# Patient Record
Sex: FEMALE | Race: BLACK OR AFRICAN AMERICAN | NOT HISPANIC OR LATINO | URBAN - METROPOLITAN AREA
[De-identification: names, ages, dates, MRNs, and addresses within clinical notes are randomized per-mention and may not be internally consistent; named-entity substitution may affect disease eponyms.]

---

## 2020-06-25 ENCOUNTER — APPOINTMENT (RX ONLY)
Dept: URBAN - METROPOLITAN AREA CLINIC 1 | Facility: CLINIC | Age: 60
Setting detail: DERMATOLOGY
End: 2020-06-25

## 2020-06-25 DIAGNOSIS — L30.1 DYSHIDROSIS [POMPHOLYX]: ICD-10-CM

## 2020-06-25 DIAGNOSIS — L29.8 OTHER PRURITUS: ICD-10-CM

## 2020-06-25 DIAGNOSIS — L29.89 OTHER PRURITUS: ICD-10-CM

## 2020-06-25 DIAGNOSIS — L81.4 OTHER MELANIN HYPERPIGMENTATION: ICD-10-CM

## 2020-06-25 DIAGNOSIS — L28.0 LICHEN SIMPLEX CHRONICUS: ICD-10-CM

## 2020-06-25 DIAGNOSIS — L30.0 NUMMULAR DERMATITIS: ICD-10-CM

## 2020-06-25 PROCEDURE — ? PRESCRIPTION

## 2020-06-25 PROCEDURE — 99203 OFFICE O/P NEW LOW 30 MIN: CPT | Mod: 25

## 2020-06-25 PROCEDURE — 11900 INJECT SKIN LESIONS </W 7: CPT

## 2020-06-25 PROCEDURE — ? COUNSELING

## 2020-06-25 PROCEDURE — ? TREATMENT REGIMEN

## 2020-06-25 PROCEDURE — ? INTRALESIONAL KENALOG

## 2020-06-25 RX ORDER — TRIAMCINOLONE ACETONIDE 1 MG/G
OINTMENT TOPICAL
Qty: 1 | Refills: 2 | Status: ERX | COMMUNITY
Start: 2020-06-25

## 2020-06-25 RX ADMIN — TRIAMCINOLONE ACETONIDE: 1 OINTMENT TOPICAL at 00:00

## 2020-06-25 ASSESSMENT — LOCATION DETAILED DESCRIPTION DERM
LOCATION DETAILED: RIGHT THENAR EMINENCE
LOCATION DETAILED: LEFT ANTERIOR PROXIMAL THIGH
LOCATION DETAILED: LEFT THENAR EMINENCE
LOCATION DETAILED: LEFT LATERAL DISTAL PRETIBIAL REGION
LOCATION DETAILED: LEFT ULNAR PALM

## 2020-06-25 ASSESSMENT — LOCATION ZONE DERM
LOCATION ZONE: HAND
LOCATION ZONE: LEG

## 2020-06-25 ASSESSMENT — LOCATION SIMPLE DESCRIPTION DERM
LOCATION SIMPLE: LEFT THIGH
LOCATION SIMPLE: LEFT PRETIBIAL REGION
LOCATION SIMPLE: LEFT HAND
LOCATION SIMPLE: RIGHT HAND

## 2020-06-25 NOTE — PROCEDURE: TREATMENT REGIMEN
Detail Level: Zone
Initiate Treatment: triamcinolone acetonide 0.1 % topical ointment \\nDays Supply: 30\\nApply to hands and site  on  left lower leg bid after moisturizer\\n\\nWear gloves as much as possible
Initiate Treatment: Zyrtec bid Benadryl qhs
Initiate Treatment: Triamcinolone to left upper thigh
Initiate Treatment: Cerave to hands and lower legs two to three times daily

## 2020-06-25 NOTE — PROCEDURE: INTRALESIONAL KENALOG
Detail Level: Detailed
Concentration Of Solution Injected (Mg/Ml): 2.5
Medical Necessity Clause: This procedure was medically necessary because the lesions that were treated were:
Consent: The risks of atrophy were reviewed with the patient.
X Size Of Lesion In Cm (Optional): 0
Treatment Number (Optional): 1
Total Volume Injected (Ccs- Only Use Numbers And Decimals): 0.5
Include Z78.9 (Other Specified Conditions Influencing Health Status) As An Associated Diagnosis?: No
Kenalog Preparation: Kenalog

## 2020-07-16 ENCOUNTER — APPOINTMENT (RX ONLY)
Dept: URBAN - METROPOLITAN AREA CLINIC 1 | Facility: CLINIC | Age: 60
Setting detail: DERMATOLOGY
End: 2020-07-16

## 2020-07-16 DIAGNOSIS — L28.0 LICHEN SIMPLEX CHRONICUS: ICD-10-CM | Status: RESOLVING

## 2020-07-16 DIAGNOSIS — L30.1 DYSHIDROSIS [POMPHOLYX]: ICD-10-CM

## 2020-07-16 PROCEDURE — ? TREATMENT REGIMEN

## 2020-07-16 PROCEDURE — ? COUNSELING

## 2020-07-16 PROCEDURE — ? ADDITIONAL NOTES

## 2020-07-16 PROCEDURE — 99213 OFFICE O/P EST LOW 20 MIN: CPT

## 2020-07-16 ASSESSMENT — LOCATION SIMPLE DESCRIPTION DERM
LOCATION SIMPLE: RIGHT HAND
LOCATION SIMPLE: LEFT HAND

## 2020-07-16 ASSESSMENT — LOCATION DETAILED DESCRIPTION DERM
LOCATION DETAILED: LEFT THENAR EMINENCE
LOCATION DETAILED: RIGHT THENAR EMINENCE

## 2020-07-16 ASSESSMENT — LOCATION ZONE DERM: LOCATION ZONE: HAND

## 2020-07-16 NOTE — PROCEDURE: TREATMENT REGIMEN
Detail Level: Zone
Initiate Treatment: triamcinolone acetonide 0.1 % topical ointment \\nDays Supply: 30\\nApply to hands and site  on  left lower leg bid after moisturizer\\n\\nWear gloves as much as possible

## 2020-08-27 ENCOUNTER — APPOINTMENT (RX ONLY)
Dept: URBAN - METROPOLITAN AREA CLINIC 1 | Facility: CLINIC | Age: 60
Setting detail: DERMATOLOGY
End: 2020-08-27

## 2020-08-27 DIAGNOSIS — L30.1 DYSHIDROSIS [POMPHOLYX]: ICD-10-CM

## 2020-08-27 DIAGNOSIS — L28.0 LICHEN SIMPLEX CHRONICUS: ICD-10-CM

## 2020-08-27 PROCEDURE — 99213 OFFICE O/P EST LOW 20 MIN: CPT | Mod: 25

## 2020-08-27 PROCEDURE — ? COUNSELING

## 2020-08-27 PROCEDURE — ? ADDITIONAL NOTES

## 2020-08-27 PROCEDURE — ? TREATMENT REGIMEN

## 2020-08-27 PROCEDURE — ? INTRALESIONAL KENALOG

## 2020-08-27 PROCEDURE — 11901 INJECT SKIN LESIONS >7: CPT

## 2020-08-27 ASSESSMENT — LOCATION DETAILED DESCRIPTION DERM
LOCATION DETAILED: RIGHT RADIAL PALM
LOCATION DETAILED: RIGHT HYPOTHENAR EMINENCE
LOCATION DETAILED: RIGHT PROXIMAL RADIAL THUMB
LOCATION DETAILED: LEFT DORSAL THUMB METACARPOPHALANGEAL JOINT
LOCATION DETAILED: LEFT THENAR EMINENCE
LOCATION DETAILED: LEFT ULNAR PALM
LOCATION DETAILED: RIGHT THENAR EMINENCE
LOCATION DETAILED: LEFT HYPOTHENAR EMINENCE
LOCATION DETAILED: LEFT RADIAL PALM
LOCATION DETAILED: RIGHT ULNAR PALM

## 2020-08-27 ASSESSMENT — LOCATION ZONE DERM
LOCATION ZONE: HAND
LOCATION ZONE: FINGER

## 2020-08-27 ASSESSMENT — LOCATION SIMPLE DESCRIPTION DERM
LOCATION SIMPLE: LEFT HAND
LOCATION SIMPLE: RIGHT THUMB
LOCATION SIMPLE: RIGHT HAND

## 2020-08-27 NOTE — PROCEDURE: INTRALESIONAL KENALOG
Medical Necessity Clause: This procedure was medically necessary because the lesions that were treated were:
X Size Of Lesion In Cm (Optional): 0
Kenalog Preparation: Kenalog
Detail Level: Detailed
Concentration Of Solution Injected (Mg/Ml): 4.0
Consent: The risks of atrophy were reviewed with the patient.
Include Z78.9 (Other Specified Conditions Influencing Health Status) As An Associated Diagnosis?: No
Total Volume Injected (Ccs- Only Use Numbers And Decimals): 2

## 2020-09-24 ENCOUNTER — APPOINTMENT (RX ONLY)
Dept: URBAN - METROPOLITAN AREA CLINIC 1 | Facility: CLINIC | Age: 60
Setting detail: DERMATOLOGY
End: 2020-09-24

## 2020-09-24 DIAGNOSIS — L28.0 LICHEN SIMPLEX CHRONICUS: ICD-10-CM | Status: IMPROVED

## 2020-09-24 DIAGNOSIS — L30.1 DYSHIDROSIS [POMPHOLYX]: ICD-10-CM | Status: IMPROVED

## 2020-09-24 PROCEDURE — ? ADDITIONAL NOTES

## 2020-09-24 PROCEDURE — ? COUNSELING

## 2020-09-24 PROCEDURE — ? TREATMENT REGIMEN

## 2020-09-24 PROCEDURE — 99213 OFFICE O/P EST LOW 20 MIN: CPT

## 2020-09-24 ASSESSMENT — LOCATION DETAILED DESCRIPTION DERM
LOCATION DETAILED: LEFT THENAR EMINENCE
LOCATION DETAILED: RIGHT THENAR EMINENCE

## 2020-09-24 ASSESSMENT — LOCATION ZONE DERM: LOCATION ZONE: HAND

## 2020-09-24 ASSESSMENT — LOCATION SIMPLE DESCRIPTION DERM
LOCATION SIMPLE: RIGHT HAND
LOCATION SIMPLE: LEFT HAND

## 2020-09-24 NOTE — PROCEDURE: TREATMENT REGIMEN
Continue Regimen: triamcinolone acetonide 0.1 % topical ointment Apply to hands and site  on  left lower leg bid after moisturizer prn for flares \\n\\nWear gloves as much as possible
Detail Level: Zone
Plan: No IL injections to legs or hands

## 2020-10-15 ENCOUNTER — APPOINTMENT (RX ONLY)
Dept: URBAN - METROPOLITAN AREA CLINIC 1 | Facility: CLINIC | Age: 60
Setting detail: DERMATOLOGY
End: 2020-10-15

## 2020-10-15 DIAGNOSIS — L28.0 LICHEN SIMPLEX CHRONICUS: ICD-10-CM

## 2020-10-15 DIAGNOSIS — L30.1 DYSHIDROSIS [POMPHOLYX]: ICD-10-CM

## 2020-10-15 PROCEDURE — ? TREATMENT REGIMEN

## 2020-10-15 PROCEDURE — 99213 OFFICE O/P EST LOW 20 MIN: CPT | Mod: 25

## 2020-10-15 PROCEDURE — ? COUNSELING

## 2020-10-15 PROCEDURE — ? ADDITIONAL NOTES

## 2020-10-15 PROCEDURE — 11900 INJECT SKIN LESIONS </W 7: CPT

## 2020-10-15 PROCEDURE — ? INTRALESIONAL KENALOG

## 2020-10-15 ASSESSMENT — LOCATION DETAILED DESCRIPTION DERM
LOCATION DETAILED: LEFT PROXIMAL DORSAL SMALL FINGER
LOCATION DETAILED: DORSAL INTERPHALANGEAL JOINT LEFT THUMB
LOCATION DETAILED: RIGHT THENAR EMINENCE
LOCATION DETAILED: LEFT DISTAL RADIAL THUMB
LOCATION DETAILED: LEFT PROXIMAL DORSAL RING FINGER
LOCATION DETAILED: LEFT THENAR EMINENCE
LOCATION DETAILED: RIGHT PROXIMAL PALMAR SMALL FINGER
LOCATION DETAILED: RIGHT PROXIMAL PALMAR MIDDLE FINGER
LOCATION DETAILED: PERIUNGUAL SKIN RIGHT THUMB

## 2020-10-15 ASSESSMENT — LOCATION SIMPLE DESCRIPTION DERM
LOCATION SIMPLE: LEFT HAND
LOCATION SIMPLE: LEFT RING FINGER
LOCATION SIMPLE: LEFT SMALL FINGER
LOCATION SIMPLE: RIGHT HAND
LOCATION SIMPLE: RIGHT MIDDLE FINGER
LOCATION SIMPLE: RIGHT THUMB
LOCATION SIMPLE: RIGHT SMALL FINGER
LOCATION SIMPLE: LEFT THUMB

## 2020-10-15 ASSESSMENT — LOCATION ZONE DERM
LOCATION ZONE: FINGER
LOCATION ZONE: HAND

## 2020-10-15 NOTE — PROCEDURE: TREATMENT REGIMEN
Continue Regimen: triamcinolone acetonide 0.1 % topical ointment Apply to hands and site  on  left lower leg bid after moisturizer prn for flares \\n\\nWear gloves as much as possible
Detail Level: Zone

## 2020-10-15 NOTE — PROCEDURE: INTRALESIONAL KENALOG
Detail Level: Detailed
Medical Necessity Clause: This procedure was medically necessary because the lesions that were treated were:
Treatment Number (Optional): 2
X Size Of Lesion In Cm (Optional): 0
Total Volume Injected (Ccs- Only Use Numbers And Decimals): 1
Consent: The risks of atrophy were reviewed with the patient.
Kenalog Preparation: Kenalog
Concentration Of Solution Injected (Mg/Ml): 4.0
Include Z78.9 (Other Specified Conditions Influencing Health Status) As An Associated Diagnosis?: No
no

## 2020-11-05 ENCOUNTER — APPOINTMENT (RX ONLY)
Dept: URBAN - METROPOLITAN AREA CLINIC 1 | Facility: CLINIC | Age: 60
Setting detail: DERMATOLOGY
End: 2020-11-05

## 2020-11-05 DIAGNOSIS — L28.0 LICHEN SIMPLEX CHRONICUS: ICD-10-CM

## 2020-11-05 DIAGNOSIS — L30.1 DYSHIDROSIS [POMPHOLYX]: ICD-10-CM

## 2020-11-05 DIAGNOSIS — L20.89 OTHER ATOPIC DERMATITIS: ICD-10-CM | Status: IMPROVED

## 2020-11-05 PROCEDURE — 99213 OFFICE O/P EST LOW 20 MIN: CPT | Mod: 25

## 2020-11-05 PROCEDURE — ? TREATMENT REGIMEN

## 2020-11-05 PROCEDURE — ? INTRALESIONAL KENALOG

## 2020-11-05 PROCEDURE — ? ADDITIONAL NOTES

## 2020-11-05 PROCEDURE — ? COUNSELING

## 2020-11-05 PROCEDURE — 11900 INJECT SKIN LESIONS </W 7: CPT

## 2020-11-05 ASSESSMENT — LOCATION DETAILED DESCRIPTION DERM
LOCATION DETAILED: LEFT PROXIMAL DORSAL RING FINGER
LOCATION DETAILED: PERIUNGUAL SKIN RIGHT THUMB
LOCATION DETAILED: LEFT ULNAR DORSAL HAND
LOCATION DETAILED: RIGHT PROXIMAL PALMAR MIDDLE FINGER
LOCATION DETAILED: LEFT THENAR EMINENCE
LOCATION DETAILED: LEFT DISTAL RADIAL THUMB
LOCATION DETAILED: LEFT PROXIMAL DORSAL SMALL FINGER
LOCATION DETAILED: RIGHT THENAR EMINENCE
LOCATION DETAILED: DORSAL INTERPHALANGEAL JOINT LEFT THUMB
LOCATION DETAILED: RIGHT PROXIMAL PALMAR SMALL FINGER
LOCATION DETAILED: RIGHT RADIAL DORSAL HAND

## 2020-11-05 ASSESSMENT — LOCATION SIMPLE DESCRIPTION DERM
LOCATION SIMPLE: LEFT HAND
LOCATION SIMPLE: LEFT SMALL FINGER
LOCATION SIMPLE: RIGHT HAND
LOCATION SIMPLE: LEFT RING FINGER
LOCATION SIMPLE: RIGHT SMALL FINGER
LOCATION SIMPLE: LEFT THUMB
LOCATION SIMPLE: RIGHT THUMB
LOCATION SIMPLE: RIGHT MIDDLE FINGER

## 2020-11-05 ASSESSMENT — LOCATION ZONE DERM
LOCATION ZONE: FINGER
LOCATION ZONE: HAND

## 2020-11-05 NOTE — PROCEDURE: INTRALESIONAL KENALOG
Detail Level: Detailed
Medical Necessity Clause: This procedure was medically necessary because the lesions that were treated were:
Treatment Number (Optional): 3
X Size Of Lesion In Cm (Optional): 0
Total Volume Injected (Ccs- Only Use Numbers And Decimals): 1
Consent: The risks of atrophy were reviewed with the patient.
Kenalog Preparation: Kenalog
Concentration Of Solution Injected (Mg/Ml): 4.0
Include Z78.9 (Other Specified Conditions Influencing Health Status) As An Associated Diagnosis?: No

## 2020-11-05 NOTE — PROCEDURE: TREATMENT REGIMEN
Detail Level: Zone
Otc Regimen: Continue Zyrtec bid \\nWill consider whining off on follow up appointment
Continue Regimen: triamcinolone acetonide 0.1 % topical ointment Apply to hands and site  on  left lower leg bid after moisturizer prn for flares \\n\\nWear gloves as much as possible

## 2020-12-01 ENCOUNTER — APPOINTMENT (RX ONLY)
Dept: URBAN - METROPOLITAN AREA CLINIC 1 | Facility: CLINIC | Age: 60
Setting detail: DERMATOLOGY
End: 2020-12-01

## 2020-12-01 DIAGNOSIS — L28.0 LICHEN SIMPLEX CHRONICUS: ICD-10-CM

## 2020-12-01 DIAGNOSIS — L30.1 DYSHIDROSIS [POMPHOLYX]: ICD-10-CM

## 2020-12-01 PROCEDURE — ? ADDITIONAL NOTES

## 2020-12-01 PROCEDURE — ? TREATMENT REGIMEN

## 2020-12-01 PROCEDURE — ? COUNSELING

## 2020-12-01 PROCEDURE — 11900 INJECT SKIN LESIONS </W 7: CPT

## 2020-12-01 PROCEDURE — 99213 OFFICE O/P EST LOW 20 MIN: CPT | Mod: 25

## 2020-12-01 PROCEDURE — ? INTRALESIONAL KENALOG

## 2020-12-01 ASSESSMENT — LOCATION ZONE DERM
LOCATION ZONE: FINGER
LOCATION ZONE: HAND

## 2020-12-01 ASSESSMENT — LOCATION DETAILED DESCRIPTION DERM
LOCATION DETAILED: RIGHT PROXIMAL PALMAR SMALL FINGER
LOCATION DETAILED: PERIUNGUAL SKIN RIGHT THUMB
LOCATION DETAILED: LEFT THENAR EMINENCE
LOCATION DETAILED: DORSAL INTERPHALANGEAL JOINT LEFT THUMB
LOCATION DETAILED: LEFT DISTAL RADIAL THUMB
LOCATION DETAILED: RIGHT THENAR EMINENCE

## 2020-12-01 ASSESSMENT — LOCATION SIMPLE DESCRIPTION DERM
LOCATION SIMPLE: RIGHT THUMB
LOCATION SIMPLE: LEFT THUMB
LOCATION SIMPLE: LEFT HAND
LOCATION SIMPLE: RIGHT HAND
LOCATION SIMPLE: RIGHT SMALL FINGER

## 2020-12-01 NOTE — PROCEDURE: INTRALESIONAL KENALOG
Detail Level: Detailed
Medical Necessity Clause: This procedure was medically necessary because the lesions that were treated were:
Treatment Number (Optional): 4
X Size Of Lesion In Cm (Optional): 0
Total Volume Injected (Ccs- Only Use Numbers And Decimals): 1
Consent: The risks of atrophy were reviewed with the patient.
Kenalog Preparation: Kenalog
Concentration Of Solution Injected (Mg/Ml): 4.0
Include Z78.9 (Other Specified Conditions Influencing Health Status) As An Associated Diagnosis?: No

## 2021-01-28 ENCOUNTER — APPOINTMENT (RX ONLY)
Dept: URBAN - METROPOLITAN AREA CLINIC 1 | Facility: CLINIC | Age: 61
Setting detail: DERMATOLOGY
End: 2021-01-28

## 2021-01-28 DIAGNOSIS — L28.0 LICHEN SIMPLEX CHRONICUS: ICD-10-CM | Status: INADEQUATELY CONTROLLED

## 2021-01-28 DIAGNOSIS — L30.1 DYSHIDROSIS [POMPHOLYX]: ICD-10-CM | Status: IMPROVED

## 2021-01-28 PROCEDURE — ? ADDITIONAL NOTES

## 2021-01-28 PROCEDURE — ? TREATMENT REGIMEN

## 2021-01-28 PROCEDURE — 99213 OFFICE O/P EST LOW 20 MIN: CPT | Mod: 25

## 2021-01-28 PROCEDURE — ? INTRALESIONAL KENALOG

## 2021-01-28 PROCEDURE — 11900 INJECT SKIN LESIONS </W 7: CPT

## 2021-01-28 PROCEDURE — ? COUNSELING

## 2021-01-28 ASSESSMENT — LOCATION DETAILED DESCRIPTION DERM
LOCATION DETAILED: DORSAL INTERPHALANGEAL JOINT LEFT THUMB
LOCATION DETAILED: PERIUNGUAL SKIN RIGHT THUMB
LOCATION DETAILED: LEFT THENAR EMINENCE
LOCATION DETAILED: RIGHT PROXIMAL PALMAR SMALL FINGER
LOCATION DETAILED: LEFT DISTAL RADIAL THUMB
LOCATION DETAILED: RIGHT THENAR EMINENCE

## 2021-01-28 ASSESSMENT — LOCATION SIMPLE DESCRIPTION DERM
LOCATION SIMPLE: RIGHT HAND
LOCATION SIMPLE: RIGHT THUMB
LOCATION SIMPLE: RIGHT SMALL FINGER
LOCATION SIMPLE: LEFT THUMB
LOCATION SIMPLE: LEFT HAND

## 2021-01-28 ASSESSMENT — LOCATION ZONE DERM
LOCATION ZONE: HAND
LOCATION ZONE: FINGER

## 2021-01-28 NOTE — PROCEDURE: TREATMENT REGIMEN
Continue Regimen: triamcinolone acetonide 0.1 % topical ointment Apply to hands bid after moisturizer prn for flares \\n\\nWear gloves as much as possible
Detail Level: Zone

## 2021-01-28 NOTE — PROCEDURE: INTRALESIONAL KENALOG
Detail Level: Detailed
Medical Necessity Clause: This procedure was medically necessary because the lesions that were treated were:
Treatment Number (Optional): 5
X Size Of Lesion In Cm (Optional): 0
Total Volume Injected (Ccs- Only Use Numbers And Decimals): 1
Consent: The risks of atrophy were reviewed with the patient.
Kenalog Preparation: Kenalog
Concentration Of Solution Injected (Mg/Ml): 4.0
Include Z78.9 (Other Specified Conditions Influencing Health Status) As An Associated Diagnosis?: No

## 2021-03-11 ENCOUNTER — APPOINTMENT (RX ONLY)
Dept: URBAN - METROPOLITAN AREA CLINIC 1 | Facility: CLINIC | Age: 61
Setting detail: DERMATOLOGY
End: 2021-03-11

## 2021-03-11 DIAGNOSIS — L30.1 DYSHIDROSIS [POMPHOLYX]: ICD-10-CM | Status: INADEQUATELY CONTROLLED

## 2021-03-11 DIAGNOSIS — L28.0 LICHEN SIMPLEX CHRONICUS: ICD-10-CM | Status: INADEQUATELY CONTROLLED

## 2021-03-11 PROCEDURE — ? TREATMENT REGIMEN

## 2021-03-11 PROCEDURE — ? COUNSELING

## 2021-03-11 PROCEDURE — 99213 OFFICE O/P EST LOW 20 MIN: CPT | Mod: 25

## 2021-03-11 PROCEDURE — 11900 INJECT SKIN LESIONS </W 7: CPT

## 2021-03-11 PROCEDURE — ? ADDITIONAL NOTES

## 2021-03-11 PROCEDURE — ? INTRALESIONAL KENALOG

## 2021-03-11 ASSESSMENT — LOCATION SIMPLE DESCRIPTION DERM
LOCATION SIMPLE: LEFT WRIST
LOCATION SIMPLE: RIGHT HAND
LOCATION SIMPLE: LEFT HAND
LOCATION SIMPLE: LEFT THUMB
LOCATION SIMPLE: RIGHT THUMB
LOCATION SIMPLE: RIGHT SMALL FINGER
LOCATION SIMPLE: RIGHT WRIST

## 2021-03-11 ASSESSMENT — LOCATION DETAILED DESCRIPTION DERM
LOCATION DETAILED: LEFT THENAR EMINENCE
LOCATION DETAILED: DORSAL INTERPHALANGEAL JOINT LEFT THUMB
LOCATION DETAILED: RIGHT THENAR EMINENCE
LOCATION DETAILED: LEFT VENTRAL WRIST
LOCATION DETAILED: RIGHT VENTRAL WRIST
LOCATION DETAILED: PERIUNGUAL SKIN RIGHT THUMB
LOCATION DETAILED: LEFT DISTAL RADIAL THUMB
LOCATION DETAILED: RIGHT PROXIMAL PALMAR SMALL FINGER

## 2021-03-11 ASSESSMENT — LOCATION ZONE DERM
LOCATION ZONE: ARM
LOCATION ZONE: HAND
LOCATION ZONE: FINGER

## 2021-03-11 NOTE — PROCEDURE: INTRALESIONAL KENALOG
Detail Level: Detailed
Medical Necessity Clause: This procedure was medically necessary because the lesions that were treated were:
Treatment Number (Optional): 6
X Size Of Lesion In Cm (Optional): 0
Total Volume Injected (Ccs- Only Use Numbers And Decimals): 1
Consent: The risks of atrophy were reviewed with the patient.
Kenalog Preparation: Kenalog
Concentration Of Solution Injected (Mg/Ml): 4.0
Include Z78.9 (Other Specified Conditions Influencing Health Status) As An Associated Diagnosis?: No
